# Patient Record
Sex: FEMALE | Race: BLACK OR AFRICAN AMERICAN | URBAN - METROPOLITAN AREA
[De-identification: names, ages, dates, MRNs, and addresses within clinical notes are randomized per-mention and may not be internally consistent; named-entity substitution may affect disease eponyms.]

---
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
## 2019-03-21 ENCOUNTER — HISTORICAL (OUTPATIENT)
Dept: INTERNAL MEDICINE | Facility: CLINIC | Age: 19
End: 2019-03-21

## 2019-03-21 LAB
ABS NEUT (OLG): 4.67 X10(3)/MCL (ref 2.1–9.2)
ALBUMIN SERPL-MCNC: 3.3 GM/DL (ref 3.4–5)
ALBUMIN/GLOB SERPL: 0.7 RATIO (ref 1.1–2)
ALP SERPL-CCNC: 81 UNIT/L (ref 45–117)
ALT SERPL-CCNC: 33 UNIT/L (ref 12–78)
APPEARANCE, UA: CLEAR
AST SERPL-CCNC: 11 UNIT/L (ref 15–37)
BACTERIA #/AREA URNS AUTO: ABNORMAL /[HPF]
BASOPHILS # BLD AUTO: 0.02 X10(3)/MCL
BASOPHILS NFR BLD AUTO: 0 %
BILIRUB SERPL-MCNC: 0.2 MG/DL (ref 0.2–1)
BILIRUB UR QL STRIP: NEGATIVE
BILIRUBIN DIRECT+TOT PNL SERPL-MCNC: 0.1 MG/DL
BILIRUBIN DIRECT+TOT PNL SERPL-MCNC: 0.1 MG/DL
BUN SERPL-MCNC: 10 MG/DL (ref 7–18)
CALCIUM SERPL-MCNC: 8.7 MG/DL (ref 8.5–10.1)
CHLORIDE SERPL-SCNC: 109 MMOL/L (ref 98–107)
CHOLEST SERPL-MCNC: 149 MG/DL
CHOLEST/HDLC SERPL: 2.8 {RATIO} (ref 0–4.4)
CO2 SERPL-SCNC: 28 MMOL/L (ref 21–32)
COLOR UR: YELLOW
CREAT SERPL-MCNC: 0.8 MG/DL (ref 0.6–1.3)
EOSINOPHIL # BLD AUTO: 0.4 10*3/UL
EOSINOPHIL NFR BLD AUTO: 4 %
ERYTHROCYTE [DISTWIDTH] IN BLOOD BY AUTOMATED COUNT: 13.7 % (ref 11.5–14.5)
EST. AVERAGE GLUCOSE BLD GHB EST-MCNC: 123 MG/DL
GLOBULIN SER-MCNC: 4.6 GM/ML (ref 2.3–3.5)
GLUCOSE (UA): NORMAL
GLUCOSE SERPL-MCNC: 74 MG/DL (ref 74–106)
HBA1C MFR BLD: 5.9 % (ref 4.2–6.3)
HCT VFR BLD AUTO: 39.2 % (ref 35–46)
HDLC SERPL-MCNC: 53 MG/DL
HGB BLD-MCNC: 11.7 GM/DL (ref 12–16)
HGB UR QL STRIP: 0.2 MG/DL
HYALINE CASTS #/AREA URNS LPF: ABNORMAL /[LPF]
IMM GRANULOCYTES # BLD AUTO: 0.03 10*3/UL
IMM GRANULOCYTES NFR BLD AUTO: 0 %
KETONES UR QL STRIP: NEGATIVE
LDLC SERPL CALC-MCNC: 81 MG/DL (ref 0–110)
LEUKOCYTE ESTERASE UR QL STRIP: NEGATIVE
LYMPHOCYTES # BLD AUTO: 3.75 X10(3)/MCL
LYMPHOCYTES NFR BLD AUTO: 39 % (ref 13–40)
MCH RBC QN AUTO: 25.4 PG (ref 26–34)
MCHC RBC AUTO-ENTMCNC: 29.8 GM/DL (ref 31–37)
MCV RBC AUTO: 85.2 FL (ref 80–100)
MONOCYTES # BLD AUTO: 0.65 X10(3)/MCL
MONOCYTES NFR BLD AUTO: 7 % (ref 0–10)
NEUTROPHILS # BLD AUTO: 4.67 X10(3)/MCL
NEUTROPHILS NFR BLD AUTO: 49 X10(3)/MCL
NITRITE UR QL STRIP: NEGATIVE
PH UR STRIP: 6 [PH] (ref 4.5–8)
PLATELET # BLD AUTO: 346 X10(3)/MCL (ref 130–400)
PMV BLD AUTO: 9.7 FL (ref 7.4–10.4)
POTASSIUM SERPL-SCNC: 3.9 MMOL/L (ref 3.5–5.1)
PROT SERPL-MCNC: 7.9 GM/DL (ref 6.4–8.2)
PROT UR QL STRIP: 10 MG/DL
RBC # BLD AUTO: 4.6 X10(6)/MCL (ref 4–5.2)
RBC #/AREA URNS AUTO: ABNORMAL /[HPF]
SODIUM SERPL-SCNC: 141 MMOL/L (ref 136–145)
SP GR UR STRIP: 1.03 (ref 1–1.03)
SQUAMOUS #/AREA URNS LPF: ABNORMAL /[LPF]
TRIGL SERPL-MCNC: 74 MG/DL
UROBILINOGEN UR STRIP-ACNC: NORMAL
VLDLC SERPL CALC-MCNC: 15 MG/DL
WBC # SPEC AUTO: 9.5 X10(3)/MCL (ref 4.5–11)
WBC #/AREA URNS AUTO: ABNORMAL /HPF

## 2020-01-15 ENCOUNTER — HISTORICAL (OUTPATIENT)
Dept: ADMINISTRATIVE | Facility: HOSPITAL | Age: 20
End: 2020-01-15

## 2020-01-17 LAB — FINAL CULTURE: NORMAL

## 2020-02-08 ENCOUNTER — HISTORICAL (OUTPATIENT)
Dept: ADMINISTRATIVE | Facility: HOSPITAL | Age: 20
End: 2020-02-08

## 2020-02-12 LAB
FINAL CULTURE: NORMAL
GRAM STN SPEC: NORMAL

## 2020-02-27 ENCOUNTER — HISTORICAL (OUTPATIENT)
Dept: ADMINISTRATIVE | Facility: HOSPITAL | Age: 20
End: 2020-02-27

## 2020-02-27 LAB
HAV IGM SERPL QL IA: NONREACTIVE
HBV CORE IGM SERPL QL IA: NONREACTIVE
HBV SURFACE AG SERPL QL IA: NONREACTIVE
HCV AB SERPL QL IA: NONREACTIVE

## 2021-04-27 ENCOUNTER — HISTORICAL (OUTPATIENT)
Dept: ADMINISTRATIVE | Facility: HOSPITAL | Age: 21
End: 2021-04-27

## 2021-04-28 LAB — FINAL CULTURE: NORMAL

## 2022-04-10 ENCOUNTER — HISTORICAL (OUTPATIENT)
Dept: ADMINISTRATIVE | Facility: HOSPITAL | Age: 22
End: 2022-04-10

## 2022-04-26 VITALS
HEIGHT: 64 IN | DIASTOLIC BLOOD PRESSURE: 70 MMHG | BODY MASS INDEX: 47.95 KG/M2 | WEIGHT: 280.88 LBS | SYSTOLIC BLOOD PRESSURE: 112 MMHG

## 2022-05-03 NOTE — HISTORICAL [facility] CERNER
This is a historical note converted from Ester. Formatting and pictures may have been removed.  Please reference Ester for original formatting and attached multimedia. Chief Complaint  Needs pcp  History of Present Illness  18-year-old -American?female presents to establish care in the internal medicine clinic. ?She has a past medical history of?morbid obesity,?asthma,?and impaired fasting glucose.? She used to see a pediatric?nurse practitioner?at [facility] but now that she is of age, she was referred?to this clinic.? Patient denies any complaints today?and tells me that she has time to do lab work.? She currently works at [employer] and goes to school.? Tells me that she has been trying to eat healthier and exercise regularly.  In regards to her asthma, patient says?that she finds herself using her Ventolin inhaler twice daily.  Review of Systems  ????Constitutional: No fever, No chills, No weakness, No fatigue.  ?????Eye: No recent visual problem.  ?????Respiratory: No shortness of breath, No cough, No sputum production, No wheezing.  ?????Cardiovascular: No chest pain, No palpitations, No peripheral edema.  ?????Gastrointestinal: No nausea, No vomiting, No diarrhea, No constipation, No heartburn, No abdominal pain.  ?????Genitourinary: No dysuria.  ?????Endocrine: No excessive thirst, No polyuria, No cold intolerance, No heat intolerance.  ?????Musculoskeletal: No back pain, No joint pain, No decreased range of motion.  ?????Integumentary: No rash, No pruritus.  ?????Neurologic: Alert and oriented X4, No numbness, No tingling, No headache.  ?????Psychiatric: No anxiety, No depression.  Physical Exam  Vitals & Measurements  T:?37.2? ?C (Oral)? HR:?74(Peripheral)? BP:?117/79?  HT:?162?cm? HT:?162?cm? HT:?162?cm? WT:?126.0?kg? WT:?126.0?kg? WT:?126.0?kg? BMI:?48.01?  General: Alert and oriented, No acute distress.  ?????Eye: Pupils are equal, round and reactive to light, Extraocular movements are  intact, Normal conjunctiva.  ?????HENT: Normocephalic, Oral mucosa is moist, No pharyngeal erythema.  ?????Neck: Supple, Non-tender.  ?????Respiratory: Lungs are clear to auscultation, Respirations are non-labored.  ?????Cardiovascular: Normal rate, Regular rhythm, No murmur, Good pulses equal in all extremities, No edema.  ?????Gastrointestinal: Soft, Non-tender, Non-distended, Normal bowel sounds.  ?????Musculoskeletal: Normal range of motion, Normal strength.  ?????Integumentary: Warm, Dry.  ?????Neurologic: Alert, Oriented.  ?????Cognition and Speech: Oriented, Speech clear and coherent.  ?????Psychiatric: Cooperative, Appropriate mood & affect.  Assessment/Plan  Flu vaccine need?Z23,?Flu vaccine need?Z23  Ordered:  Influenza Virus Vaccine, Inactivated, 0.5 mL, form: Susp, IM, Once-Unscheduled, first dose 11/19/18 13:02:00 CST  ?  Moderate persistent asthma?J45.40  Ordered:  albuterol, 2 puff(s), INH, QID, # 3 EA, 5 Refill(s), Pharmacy: [address] [facility]  fluticasone-salmeterol, 1 puff(s), INH, BID, rinse mouth and throat after use, # 60 EA, 5 Refill(s), Pharmacy: [address] [facility]  ?  Orders:  CBC w/ Auto Diff, Routine collect, *Est. 11/19/18 3:00:00 CST, Blood, Order for future visit, *Est. Stop date 11/19/18 3:00:00 CST, Lab Collect, IFG (impaired fasting glucose), 11/19/18 12:58:00 CST  CBC w/ Auto Diff, Routine collect, *Est. 02/19/19 3:00:00 CST, Blood, Order for future visit, *Est. Stop date 02/19/19 3:00:00 CST, Lab Collect, Wellness examination  Screening cholesterol level  IFG (impaired fasting glucose), 11/19/18 12:58:00 CST  Clinic Follow up, *Est. 02/26/19 3:00:00 CST, Order for future visit, Wellness examination  Screening cholesterol level  IFG (impaired fasting glucose), [facility]  Comprehensive Metabolic Panel, Routine collect, *Est. 11/19/18 3:00:00 CST, Blood, Order for future visit, *Est. Stop date 11/19/18 3:00:00 CST, Lab Collect, IFG (impaired fasting glucose),  11/19/18 12:58:00 CST  Comprehensive Metabolic Panel, Routine collect, *Est. 02/19/19 3:00:00 CST, Blood, Order for future visit, *Est. Stop date 02/19/19 3:00:00 CST, Lab Collect, Wellness examination  Screening cholesterol level  IFG (impaired fasting glucose), 11/19/18 12:58:00 CST  Hemoglobin A1C UHC, Routine collect, *Est. 02/19/19 3:00:00 CST, Blood, Order for future visit, *Est. Stop date 02/19/19 3:00:00 CST, Lab Collect, Wellness examination  Screening cholesterol level  IFG (impaired fasting glucose), 11/19/18 12:58:00 CST  Hemoglobin A1C UHC, Routine collect, *Est. 11/19/18 3:00:00 CST, Blood, Order for future visit, *Est. Stop date 11/19/18 3:00:00 CST, Lab Collect, IFG (impaired fasting glucose), 11/19/18 12:58:00 CST  Lipid Panel, Routine collect, *Est. 02/19/19 3:00:00 CST, Blood, Order for future visit, *Est. Stop date 02/19/19 3:00:00 CST, Lab Collect, Wellness examination  Screening cholesterol level  IFG (impaired fasting glucose), 11/19/18 12:58:00 CST  Urinalysis with Microscopic if Indicated, Routine collect, Urine, Order for future visit, *Est. 02/19/19 3:00:00 CST, *Est. Stop date 02/19/19 3:00:00 CST, Nurse collect, Wellness examination  Screening cholesterol level  IFG (impaired fasting glucose)  ?  1.? Wellness:?CBC with differential, CMP,?and urinalysis today.  Fasting labs one week prior to follow-up appointment in 3 months.  2.? History of impaired fasting glucose: HbA1c today.  3.? Moderate persistent?asthma:?Start Advair inhaler, refilled Ventolin inhaler to be used as needed.  ?  Patient voiced understanding.   Problem List/Past Medical History  Ongoing  No qualifying data  Historical  No qualifying data  Procedure/Surgical History  Denies Any   Medications  Advair Diskus 100 mcg-50 mcg inhalation powder, 1 puff(s), INH, BID, 5 refills  influenza virus vaccine, inactivated preserve-free pediatric quadrivalent intramuscular suspension, 0.5 mL, IM, Once-Unscheduled  Ventolin  HFA 90 mcg/inh inhalation aerosol, 2 puff(s), INH, QID, 5 refills  Allergies  No Known Allergies  Social History  Alcohol  Current, 1-2 times per year, 11/19/2018  Employment/School  Employed, Highest education level: High school., 11/19/2018  Exercise  Exercise type: Walking., 11/19/2018  Home/Environment  Lives with Mother, Siblings. Living situation: Home/Independent. Alcohol abuse in household: No. Substance abuse in household: No. Smoker in household: No. Feels unsafe at home: No. Safe place to go: Yes. Family/Friends available for support: Yes. Concern for family members at home: No. Major illness in household: No., 11/19/2018  Nutrition/Health  Regular, Wants to lose weight: Yes. Sleeping concerns: No. Feels highly stressed: No., 11/19/2018  Substance Abuse  Never, 11/19/2018  Tobacco  Never smoker, N/A, 11/19/2018  Family History  Asthma.: Sister.  Healthy adult: Father and Brother.  Hypertension.: Mother.  Health Maintenance  Health Maintenance  ???Pending?(in the next year)  ??? ??Due?  ??? ? ? ?ADL Screening due??11/19/18??and every 1??year(s)  ??? ? ? ?Alcohol Misuse Screening due??11/19/18??and every 1??year(s)  ??? ? ? ?Obesity Screening due??11/19/18??and every 1??year(s)  ??? ? ? ?Tetanus Vaccine due??11/19/18??and every 10??year(s)  ???Satisfied?(in the past 1 year)  ???There are no satisfied recommendations within the defined date range  ?  ?

## 2022-08-16 DIAGNOSIS — J45.909 ASTHMA, UNSPECIFIED ASTHMA SEVERITY, UNSPECIFIED WHETHER COMPLICATED, UNSPECIFIED WHETHER PERSISTENT: Primary | ICD-10-CM

## 2022-08-26 DIAGNOSIS — J45.909 ASTHMA, UNSPECIFIED ASTHMA SEVERITY, UNSPECIFIED WHETHER COMPLICATED, UNSPECIFIED WHETHER PERSISTENT: Primary | ICD-10-CM
